# Patient Record
Sex: MALE | Race: ASIAN | NOT HISPANIC OR LATINO
[De-identification: names, ages, dates, MRNs, and addresses within clinical notes are randomized per-mention and may not be internally consistent; named-entity substitution may affect disease eponyms.]

---

## 2020-12-11 PROBLEM — Z00.00 ENCOUNTER FOR PREVENTIVE HEALTH EXAMINATION: Status: ACTIVE | Noted: 2020-12-11

## 2020-12-14 ENCOUNTER — APPOINTMENT (OUTPATIENT)
Dept: UROLOGY | Facility: CLINIC | Age: 65
End: 2020-12-14
Payer: COMMERCIAL

## 2020-12-14 VITALS
RESPIRATION RATE: 16 BRPM | HEART RATE: 76 BPM | DIASTOLIC BLOOD PRESSURE: 74 MMHG | SYSTOLIC BLOOD PRESSURE: 114 MMHG | OXYGEN SATURATION: 100 % | WEIGHT: 138 LBS | HEIGHT: 66 IN | BODY MASS INDEX: 22.18 KG/M2 | TEMPERATURE: 97.3 F

## 2020-12-14 DIAGNOSIS — Z80.51 FAMILY HISTORY OF MALIGNANT NEOPLASM OF KIDNEY: ICD-10-CM

## 2020-12-14 DIAGNOSIS — Z80.0 FAMILY HISTORY OF MALIGNANT NEOPLASM OF DIGESTIVE ORGANS: ICD-10-CM

## 2020-12-14 DIAGNOSIS — Z86.39 PERSONAL HISTORY OF OTHER ENDOCRINE, NUTRITIONAL AND METABOLIC DISEASE: ICD-10-CM

## 2020-12-14 DIAGNOSIS — Z87.891 PERSONAL HISTORY OF NICOTINE DEPENDENCE: ICD-10-CM

## 2020-12-14 PROCEDURE — 99203 OFFICE O/P NEW LOW 30 MIN: CPT

## 2020-12-14 RX ORDER — TAMSULOSIN HYDROCHLORIDE 0.4 MG/1
0.4 CAPSULE ORAL
Qty: 30 | Refills: 2 | Status: ACTIVE | COMMUNITY
Start: 2020-12-14 | End: 1900-01-01

## 2020-12-14 RX ORDER — ATORVASTATIN CALCIUM 80 MG/1
TABLET, FILM COATED ORAL
Refills: 0 | Status: ACTIVE | COMMUNITY

## 2020-12-14 NOTE — ASSESSMENT
[FreeTextEntry1] : Patient is a 64 yo M who presents with BPH/LUTS.\par \par Will give trial of flomax for symptoms. Counseled on proper use and SE profile, including LH, retrograde ejaculation. D/w pt that if he experiences LH to stop the medication and call.\par PVR today is low 15 cc\par Recent labs show UA neg and PSA 4.1.\par Will repeat PSA \par F/u 6 wks for renal/bladder/prostate ULT - he has family hx of kidney cancer

## 2020-12-14 NOTE — PHYSICAL EXAM
[General Appearance - Well Developed] : well developed [General Appearance - Well Nourished] : well nourished [Normal Appearance] : normal appearance [Well Groomed] : well groomed [General Appearance - In No Acute Distress] : no acute distress [Edema] : no peripheral edema [Respiration, Rhythm And Depth] : normal respiratory rhythm and effort [Exaggerated Use Of Accessory Muscles For Inspiration] : no accessory muscle use [Abdomen Soft] : soft [Abdomen Tenderness] : non-tender [Abdomen Hernia] : no hernia was discovered [Costovertebral Angle Tenderness] : no ~M costovertebral angle tenderness [Urethral Meatus] : meatus normal [Urinary Bladder Findings] : the bladder was normal on palpation [Scrotum] : the scrotum was normal [Testes Tenderness] : no tenderness of the testes [Testes Mass (___cm)] : there were no testicular masses [Prostate Tenderness] : the prostate was not tender [No Prostate Nodules] : no prostate nodules [Prostate Size ___ gm] : prostate size [unfilled] gm [Normal Station and Gait] : the gait and station were normal for the patient's age [] : no rash [No Focal Deficits] : no focal deficits [Oriented To Time, Place, And Person] : oriented to person, place, and time [Affect] : the affect was normal [Mood] : the mood was normal [Not Anxious] : not anxious

## 2020-12-14 NOTE — HISTORY OF PRESENT ILLNESS
[FreeTextEntry1] : Patient is a 66 yo M who presents for 1 year of worsening LUTS.  He reports nocturia x4-5, daytime frequency, urgency and incomplete emptying.  He denies dysuria or gross hematuria.  \par \par Has not tried any BPH medications. [Urinary Incontinence] : no urinary incontinence [Urinary Retention] : no urinary retention

## 2020-12-18 LAB — PSA SERPL-MCNC: 5.48 NG/ML

## 2021-02-08 ENCOUNTER — APPOINTMENT (OUTPATIENT)
Dept: UROLOGY | Facility: CLINIC | Age: 66
End: 2021-02-08
Payer: COMMERCIAL

## 2021-02-22 ENCOUNTER — APPOINTMENT (OUTPATIENT)
Dept: UROLOGY | Facility: CLINIC | Age: 66
End: 2021-02-22
Payer: COMMERCIAL

## 2021-02-22 VITALS — TEMPERATURE: 97.2 F

## 2021-02-22 PROCEDURE — 99213 OFFICE O/P EST LOW 20 MIN: CPT | Mod: 25

## 2021-02-22 PROCEDURE — 76775 US EXAM ABDO BACK WALL LIM: CPT

## 2021-02-22 PROCEDURE — 99072 ADDL SUPL MATRL&STAF TM PHE: CPT

## 2021-02-22 NOTE — ASSESSMENT
[FreeTextEntry1] : Patient is a 67 yo M who presents for BPH/LUTS.\par \par Prostate sono volume today was 27 cc\par Renal sono unremarkable.\par Recent PSA labs reviewed - 3.4 in 1/2021\par D/w pt that given he had improvement with flomax, would give trial of alternatives alpha blocker.\par Rx for alfuzosin sent\par F/u 2 mos

## 2021-02-22 NOTE — HISTORY OF PRESENT ILLNESS
[FreeTextEntry1] : Patient is a 65 yo M who presents for worsening LUTS.  He reports nocturia x4-5, daytime frequency, urgency and incomplete emptying.  He denies dysuria or gross hematuria.  \par \par He was given trial of flomax - he reports feeling heaviness in pelvis after taking and he stopped after 1 wk.  He did notice improvement in emptying and nocturia.  Off flomax, he reports stable LUTS - nocturia x2.  \par \par Repeat PSA was 3.4 1/2021 [Urinary Incontinence] : no urinary incontinence [Urinary Retention] : no urinary retention [Dysuria] : no dysuria [Hematuria - Gross] : no gross hematuria

## 2021-05-17 ENCOUNTER — APPOINTMENT (OUTPATIENT)
Dept: UROLOGY | Facility: CLINIC | Age: 66
End: 2021-05-17

## 2021-10-14 ENCOUNTER — APPOINTMENT (OUTPATIENT)
Dept: UROLOGY | Facility: CLINIC | Age: 66
End: 2021-10-14
Payer: COMMERCIAL

## 2021-10-14 VITALS
TEMPERATURE: 97.6 F | WEIGHT: 141 LBS | HEART RATE: 72 BPM | RESPIRATION RATE: 16 BRPM | DIASTOLIC BLOOD PRESSURE: 78 MMHG | OXYGEN SATURATION: 97 % | SYSTOLIC BLOOD PRESSURE: 126 MMHG | BODY MASS INDEX: 22.76 KG/M2

## 2021-10-14 DIAGNOSIS — R35.1 NOCTURIA: ICD-10-CM

## 2021-10-14 PROCEDURE — 99213 OFFICE O/P EST LOW 20 MIN: CPT

## 2021-10-14 RX ORDER — ENEMA 19; 7 G/133ML; G/133ML
7-19 ENEMA RECTAL
Qty: 2 | Refills: 0 | Status: ACTIVE | COMMUNITY
Start: 2021-10-14 | End: 1900-01-01

## 2021-10-14 NOTE — HISTORY OF PRESENT ILLNESS
Problem: Patient Care Overview (Adult)  Goal: Plan of Care Review  Outcome: Ongoing (interventions implemented as appropriate)   03/04/18 9996   Coping/Psychosocial Response Interventions   Plan Of Care Reviewed With patient   Patient Care Overview   Progress progress toward functional goals is gradual   Outcome Evaluation   Outcome Summary/Follow up Plan Pt originally agreed to bed ex only but with encouragement tolerated sitting on eob for laq and PF ex. Brief standing to adjust bedding . Pt cooperative and encouraged to perform sup ex ad domitila.          [FreeTextEntry1] : Patient is a 67 yo M who presents for elevated PSA and LUTS.\par \par He reports nocturia x3-4, daytime frequency, urgency and incomplete emptying.  He denies dysuria or gross hematuria.  \par \par He was given trial of flomax - he reports feeling heaviness in pelvis after taking and he stopped after 1 wk.  He had mild improvement on flomax.  He was given alfuzosin, he notes no significant benefit. \par He does drink 2 glasses of water after dinner.\par \par He comes in today as repeat PSA is more elevated 4.42 9/2021.\par Prior PSA was 3.4 1/2021\par \par In Feb 2021 - TRUS volume 27cc, and a 2cm prostatic utricle cyst. Renal ult wnl.

## 2021-10-14 NOTE — ASSESSMENT
[FreeTextEntry1] : Patient is a 67 yo M who presents for BPH/LUTS and elevated PSA.\par \par PSA slowly rising - will obtain prostate MRI.\par For LUTS, d/w pt behavioral change, reduction of post dinner fluid intake.\par F/u after MRI

## 2021-11-02 RX ORDER — ALFUZOSIN HYDROCHLORIDE 10 MG/1
10 TABLET, EXTENDED RELEASE ORAL DAILY
Qty: 30 | Refills: 1 | Status: ACTIVE | COMMUNITY
Start: 2021-02-22 | End: 1900-01-01

## 2021-12-06 ENCOUNTER — APPOINTMENT (OUTPATIENT)
Dept: UROLOGY | Facility: CLINIC | Age: 66
End: 2021-12-06
Payer: COMMERCIAL

## 2021-12-06 VITALS
DIASTOLIC BLOOD PRESSURE: 75 MMHG | OXYGEN SATURATION: 98 % | SYSTOLIC BLOOD PRESSURE: 126 MMHG | WEIGHT: 141 LBS | RESPIRATION RATE: 16 BRPM | BODY MASS INDEX: 22.76 KG/M2 | TEMPERATURE: 97.7 F | HEART RATE: 86 BPM

## 2021-12-06 DIAGNOSIS — R39.14 FEELING OF INCOMPLETE BLADDER EMPTYING: ICD-10-CM

## 2021-12-06 DIAGNOSIS — R97.20 ELEVATED PROSTATE, SPECIFIC ANTIGEN [PSA]: ICD-10-CM

## 2021-12-06 PROCEDURE — 99213 OFFICE O/P EST LOW 20 MIN: CPT

## 2021-12-06 NOTE — HISTORY OF PRESENT ILLNESS
[FreeTextEntry1] : Patient is a 65 yo M who presents for elevated PSA and LUTS.\par \par He reports nocturia x3-4, daytime frequency, urgency and incomplete emptying.  He denies dysuria or gross hematuria.  \par \par He was given trial of flomax - he reports feeling heaviness in pelvis after taking and he stopped after 1 wk.  He had mild improvement on flomax.  He was given alfuzosin, he notes no significant benefit.\par He does drink 2 glasses of water after dinner.  He did try to reduce nighttime fluid intake but also states he did not notice change.\par \par Prior PSA was 3.4 1/2021\par In Feb 2021 - TRUS volume 27cc, and a 2cm prostatic utricle cyst. Renal ult wnl.\par Repeat PSA 4.42 9/2021.\par \par He had prostate MRI showing PiRADs 2 and 34.6cc.  He did have 2.2 cm posterior utricle cyst.\par He tried alfuzosin again - mild improvement in his force of stream. But otherwise still with nocturia x3-4, daytime frequency, urgency and incomplete emptying/sp pressure.\par UAs have been neg.

## 2021-12-06 NOTE — ASSESSMENT
[FreeTextEntry1] : Patient is a 65 yo M who presents for BPH/LUTS.\par Elevated PSA s/p neg MRI.\par \par - mild to no improvement in LUTS on alpha blockers\par - He does have utricle cyst - d/w pt that would perform cystoscopy to further assess urethra and urinary tract\par - F/u for cysto

## 2021-12-10 ENCOUNTER — NON-APPOINTMENT (OUTPATIENT)
Age: 66
End: 2021-12-10

## 2022-01-18 ENCOUNTER — APPOINTMENT (OUTPATIENT)
Dept: UROLOGY | Facility: CLINIC | Age: 67
End: 2022-01-18